# Patient Record
(demographics unavailable — no encounter records)

---

## 2024-11-15 NOTE — HISTORY OF PRESENT ILLNESS
[FreeTextEntry1] : 63F PMH class III obesity, HTN, HLD, arthritis, depression, glaucoma, who presents to weight management for follow-up.   She initially presented 5/2024 reporting that she was heavy throughout her life, but particularly gained after menopause, estimates increase from 180 lbs to 236 lbs over the preceding 7 years, particularly in the setting of more sedentary lifestyle s/p ankle fx 3 years earlier. Diet noted for some improvements working with dietician and her daughter, increasing protein and fiber, but struggled with some high-fat/refined-carb foods (ie potato chips, chocolate, ice cream) and night eating. Sleep interrupted, no known snoring. Of note, she'd been switched from SSRI/SNRI therapy to wellbutrin 150 mg/d about 2 months ago, no side effects, mild benefit noted.  She was prescribed Contrave (switched from wellbutrin), but we changed our mind and she started Wegovy instead.   Weight today: 218 lbs, BMI 35.19                    Weight at Initial Visit (9/13/24): 223 lbs, BMI 36.48    Weight (7/26/24): 232 lbs, BMI 37.45 Weight (6/14/24): 236 lbs, BMI 38.09 Weight at Initial Visit (5/1/24): 236 lbs, BMI 38.09   Medication: She had restarted Wegovy at 0.5 mg last visit, has continued on that. Has been taking famotidine for GERD, usually triggered by sauce, especially anything with tomato sauce. Has to use fiber gummies for constipation.   Diet: Eating lots of fruits/veg. Leaner plant-based proteins and fish/chicken.  Rarely frying food anymore.  Exercise: More active outside, using leaf blower, has more energy.    Sleep:  ("not good". No known snoring. 10 pm to 7 am, interrupted to 2-3 times to urinate, estimates total ~7 hours.

## 2024-11-15 NOTE — ASSESSMENT
[FreeTextEntry1] : 63F PMH class III obesity, HTN, HLD, arthritis, depression, glaucoma, who presents to weight management for follow-up.   # Class III obesity c/b HTN, HLD, arthritis, depression: Weight today 218 lbs, BMI 35.19, down 5 lbs from last visit 2 months ago, and 18 lbs since initial visit. Doing well on Wegovy 0.5 mg/wk since restarting, took 2 months. Managing some GERD and constipation with famotidine and fiber gummies, respectively. Seems to have less 'food noise', cravings for unhealthy things, and feels more energetic and healthier.  - Food log - Meal planning/prep - c/w dietician - Will build exercise with weight loss, discussed importance of exercise to maintain muscle mass as well as health and caloric burn. - Labs reviewed - Increase Wegovy to 1 mg, 4+ weeks - F/u in ~8 weeks